# Patient Record
(demographics unavailable — no encounter records)

---

## 2024-10-31 NOTE — HISTORY OF PRESENT ILLNESS
[FreeTextEntry1] : 67-year-old male here for follow-up after recent hospital admission. Pt had difficulty emptying his bladder x 1 week. He was started on Levaquin, Pyridium and Flomax by his PCP.  Symptoms persisted so he went to the ED. He was found to be in retention and a kirkland catheter was placed. Drained 3L in 24 hours. Labs showed an elevated creatinine 16.6. He was admitted to [facility] x 4 days. Discharged with kirkland in place. Developed left flank pain a few hours after discharge and was seen at [facility]. He was diagnosed with pyelonephritis and was discharged with a 14-day course of Cefpodoxime. Today he denies any complaints. Kirkland is draining well. He is currently taking Tamsulosin 0.4mg. Last PSA 1.7 in May.  He was also found to be in A-fib while admitted. Now on Eliquis. Has a referral to Cardiology. Newly diagnosed with Gout. Completing a 5-day course of prednisone.    CT Stone freitas (10/28/24) IMPRESSION: 1.  Diffuse bilateral perinephric and periureteral fat stranding is suspicious for pyelonephritis and ureteritis, respectively. No hydronephrosis. Nonobstructive punctate right renal calculus. 2.  Circumferential bladder wall thickening with adjacent fat stranding is suspicious for cystitis. High-density intravesical material may represent blood products.  Renal US (10/26/24) IMPRESSION: 1. Mild bilateral hydronephrosis. 2. Poorly visualized but enlarged prostate. 3. Bladder with Kirkland catheter in situ.  Urine Culture 10/28/24-No growth 10/24/24-No growth  BMP 10/23/24-Creatinine:16.6 10/28/24-Creatinine:1.42  PMH-anxiety, A-fib, gout, sleep apnea uses CPAP PSH-left knee surgery  FamHx-Denies  malignancies SocHx-Denies smoking, drugs or alcohol use Meds-Eliquis, Coreg, bupropion, duloxetine, Flomax, zolpidem Allergies-seasonal

## 2024-10-31 NOTE — LETTER BODY
[Dear  ___] : Dear  [unfilled], [Consult Letter:] : I had the pleasure of evaluating your patient, [unfilled]. [Please see my note below.] : Please see my note below. [Consult Closing:] : Thank you very much for allowing me to participate in the care of this patient.  If you have any questions, please do not hesitate to contact me. [Sincerely,] : Sincerely, [FreeTextEntry3] : Cesar Olivo MD System Director Urogynecology/URPS Department of Urology [facility]   at [facility]  of Urology [facility] [employer] at [facility]

## 2024-10-31 NOTE — HISTORY OF PRESENT ILLNESS
[FreeTextEntry1] : 67-year-old male here for follow-up after recent hospital admission. Pt had difficulty emptying his bladder x 1 week. He was started on Levaquin, Pyridium and Flomax by his PCP.  Symptoms persisted so he went to the ED. He was found to be in retention and a kirkland catheter was placed. Drained 3L in 24 hours. Labs showed an elevated creatinine 16.6. He was admitted to [facility] x 4 days. Discharged with kirkland in place. Developed left flank pain a few hours after discharge and was seen at [facility]. He was diagnosed with pyelonephritis and was discharged with a 14-day course of Cefpodoxime. Today he denies any complaints. Kirkland is draining well. He is currently taking Tamsulosin 0.4mg. Last PSA 1.7 in May.  He was also found to be in A-fib while admitted. Now on Eliquis. Has a referral to Cardiology. Newly diagnosed with Gout. Completing a 5-day course of prednisone.    CT Stone freitas (10/28/24) IMPRESSION: 1.  Diffuse bilateral perinephric and periureteral fat stranding is suspicious for pyelonephritis and ureteritis, respectively. No hydronephrosis. Nonobstructive punctate right renal calculus. 2.  Circumferential bladder wall thickening with adjacent fat stranding is suspicious for cystitis. High-density intravesical material may represent blood products.  Renal US (10/26/24) IMPRESSION: 1. Mild bilateral hydronephrosis. 2. Poorly visualized but enlarged prostate. 3. Bladder with Kirkland catheter in situ.  Urine Culture 10/28/24-No growth 10/24/24-No growth  BMP 10/23/24-Creatinine:16.6 10/28/24-Creatinine:1.42  PMH-anxiety, A-fib, gout, sleep apnea uses CPAP PSH-left knee surgery  FamHx-Denies  malignancies SocHx-Denies smoking, drugs or alcohol use Meds-Eliquis, Coreg, bupropion, duloxetine, Flomax, zolpidem Allergies-seasonal

## 2024-10-31 NOTE — ASSESSMENT
[FreeTextEntry1] :   Impression/plan: 67-year-old male with urinary retention, kirkland catheter in place.   -Causes of urinary retention reviewed -Complete course of antibiotics -Increase Tamsulosin to 0.8mg daily -Kirkland to remain in place until Monday. F/u for TOV. If unable to void will discuss kirkland vs. CIC. May need UDS. Will discuss plan further at next appointment.   I, Dr. Cesar Olivo, personally performed the evaluation and management (E/M) services for this new patient.  That E/M includes conducting the clinically appropriate initial history &/or exam, assessing all conditions, and establishing the plan of care.  Today, my ALFREDO Sarahy, was here to observe &/or participate in the visit & follow plan of care established by me.

## 2024-11-06 NOTE — ASSESSMENT
[FreeTextEntry1] : EKG  AFib   echoCONCLUSIONS:   1. Normal left ventricular size.  2. Mildly reduced left ventricular systolic function, LVEF 45-50%.  3. Normal right ventricular size and systolic function.  4. Moderately dilated left atrium.  5. Aortic sclerosis without significant stenosis.  6. Mild tricuspid regurgitation.  7. Pulmonary artery systolic pressure is 33 mmHg.  8. No pericardial effusion.  9. Mildly dilated aortic root. 10. No prior echo is available for comparison  A/P  1. PAF Hx as above  Previously asymptomatic No palpitations  EKG today remains in AF HR 90 at rest, 110 after exercise Echo w LA dilatation (likely underlying NAKUL) predispose to AF during recent urinary retenstion CLARK Plan, - change coreg to toprol 50 (HR control) -continue eliquis - for now continue rate control, pending plan from U abou need for imminent procedures requiring interruption of AC Once plan resolved, and no further interruptions anticipated, if AF persists, plan DCCV, possible RFA   2. CHF, systolic  Echo as noted LVEF 45-50 suspect tachycardia induced Continue beta blocker  hold off ARNI for now (recent CLARK, creat up to 15, last week 1.4 at )  3. NAKUL on CPAP as per PMD   4. pre operative cardiac examination HX as above Recent echo findings ass noted Asymptomatic at workload >> 4 METS Accordingly there are no cardiac contraindications to planned urodynamics or other procedures  Pt may interrupt DOAC two days pre procedure for any interventional procedure  Continue toprol daily

## 2024-11-06 NOTE — HISTORY OF PRESENT ILLNESS
[FreeTextEntry1] : 65y/o M PAF  10/24 admit [facility] p/w c/o 1 week of urinary retention, treated outpatient for UTI with  Flomax, levaquin and pyridium, and told to come to ED after no improvement.  Noted to be in acute renal failure with highly elevated Cr, and potassium  requiring insulin. Barton placed, drained 3.5L in ED, and moved to telemetry  unit for close monitoring and fluid as needed. Also found on admission to be in  Afib in RVR likely iso urinary retention. Cr rapidly improved, patient feeling  well. Urology and nephrology consulted, neither with any acute intervention and  plans to follow up outpatient. Cardiology also consulted for newly diagnosed  Afib, as well as HFmrEF on TTE, recommended starting GDMT and Eliquis.        CARDIOLOGY  #Afib w/ RVR  EKG w/ RVR to 125, no history of prior. S/p Lopressor 5mg IVP x1, improved HR  to 100s. Asymptomatic. No known cardiac history. Noted on TTE to have  moderately dilated LA. Continue to monitor, continues to be in Afib, but not  RVR. Discharged on Eliquis 5mg BID and Coreg  6.25mg   #new HFmrEF  Noted on TTE to have reduced EF to 45-50%. History of heart disease in father.  Coreg started as above.  NAKUL on CPAP  Since discharge (and prior) denies CP, SOB, orthopnea, PND, palpitations or sarah. 90 pound weiught loss on ozempic, regained, then lost 45 pounds w diet. Active in gym, no limitatioins SInce d/c noted postural dizziness upon arising from cab, n has resolved  Now per op for  diagnostic procedures.

## 2024-11-11 NOTE — HISTORY OF PRESENT ILLNESS
[FreeTextEntry1] : 67-year-old male here for follow-up after recent hospital admission. Pt had difficulty emptying his bladder x 1 week. He was started on Levaquin, Pyridium and Flomax by his PCP. Symptoms persisted so he went to the ED. He was found to be in retention and a kirkland catheter was placed. Drained 3L in 24 hours. Labs showed an elevated creatinine 16.6. He was admitted to [facility] x 4 days. Discharged with kirkland in place. Developed left flank pain a few hours after discharge and was seen at [facility]. He was diagnosed with pyelonephritis and was discharged with a 14-day course of Cefpodoxime. Today he denies any complaints. Kirkland is draining well. He is currently taking Tamsulosin 0.4mg. Last PSA 1.7 in May. He was also found to be in A-fib while admitted. Now on Eliquis. Has a referral to Cardiology. Newly diagnosed with Gout. Completing a 5-day course of prednisone.  CT Stone freitas (10/28/24) IMPRESSION: 1. Diffuse bilateral perinephric and periureteral fat stranding is suspicious for pyelonephritis and ureteritis, respectively. No hydronephrosis. Nonobstructive punctate right renal calculus. 2. Circumferential bladder wall thickening with adjacent fat stranding is suspicious for cystitis. High-density intravesical material may represent blood products.  Renal US (10/26/24) IMPRESSION: 1. Mild bilateral hydronephrosis. 2. Poorly visualized but enlarged prostate. 3. Bladder with Kirkland catheter in situ.  Urine Culture 10/28/24-No growth 10/24/24-No growth  BMP 10/23/24-Creatinine:16.6 10/28/24-Creatinine:1.42  PMH-anxiety, A-fib, gout, sleep apnea uses CPAP PSH-left knee surgery FamHx-Denies  malignancies SocHx-Denies smoking, drugs or alcohol use Meds-Eliquis, Coreg, bupropion, duloxetine, Flomax, zolpidem Allergies-seasonal   11/4/2024  HPI: 67-year-old male with urinary retention. Kirkland placed in hospital. He is here today for TOV.    TOV: - 360cc sterile water instilled via existing catheter - Catheter removed in its entirety - Patient unable to void. Kirkland catheter replaced.   Plan:  Continue Tamsulosin 0.8mg F/u for UDS  I, Dr. Cesar Olivo, personally performed the evaluation and management (E/M) services for this established patient who presents today with (a) new problem(s)/exacerbation of (an) existing condition(s).  That E/M includes conducting the clinically appropriate interval history &/or exam, assessing all new/exacerbated conditions, and establishing a new plan of care.  Today, my ALFREDO Sarahy, was here to observe &/or participate in the visit & follow plan of care established by me.

## 2024-11-11 NOTE — HISTORY OF PRESENT ILLNESS
[FreeTextEntry1] : 67-year-old male here for follow-up after recent hospital admission. Pt had difficulty emptying his bladder x 1 week. He was started on Levaquin, Pyridium and Flomax by his PCP. Symptoms persisted so he went to the ED. He was found to be in retention and a kirkland catheter was placed. Drained 3L in 24 hours. Labs showed an elevated creatinine 16.6. He was admitted to [facility] x 4 days. Discharged with kirkland in place. Developed left flank pain a few hours after discharge and was seen at [facility]. He was diagnosed with pyelonephritis and was discharged with a 14-day course of Cefpodoxime. Today he denies any complaints. Kirkland is draining well. He is currently taking Tamsulosin 0.4mg. Last PSA 1.7 in May. He was also found to be in A-fib while admitted. Now on Eliquis. Has a referral to Cardiology. Newly diagnosed with Gout. Completing a 5-day course of prednisone.  CT Stone freitas (10/28/24) IMPRESSION: 1. Diffuse bilateral perinephric and periureteral fat stranding is suspicious for pyelonephritis and ureteritis, respectively. No hydronephrosis. Nonobstructive punctate right renal calculus. 2. Circumferential bladder wall thickening with adjacent fat stranding is suspicious for cystitis. High-density intravesical material may represent blood products.  Renal US (10/26/24) IMPRESSION: 1. Mild bilateral hydronephrosis. 2. Poorly visualized but enlarged prostate. 3. Bladder with Kirkland catheter in situ.  Urine Culture 10/28/24-No growth 10/24/24-No growth  BMP 10/23/24-Creatinine:16.6 10/28/24-Creatinine:1.42  PMH-anxiety, A-fib, gout, sleep apnea uses CPAP PSH-left knee surgery FamHx-Denies  malignancies SocHx-Denies smoking, drugs or alcohol use Meds-Eliquis, Coreg, bupropion, duloxetine, Flomax, zolpidem Allergies-seasonal   11/4/2024  HPI: 67-year-old male with urinary retention. Kirkland placed in hospital. He is here today for TOV.    TOV: - 360cc sterile water instilled via existing catheter - Catheter removed in its entirety - Patient unable to void. Kirkland catheter replaced.   Plan:  Continue Tamsulosin 0.8mg F/u for UDS  I, Dr. Cesar Olivo, personally performed the evaluation and management (E/M) services for this established patient who presents today with (a) new problem(s)/exacerbation of (an) existing condition(s).  That E/M includes conducting the clinically appropriate interval history &/or exam, assessing all new/exacerbated conditions, and establishing a new plan of care.  Today, my ALFREDO Sarahy, was here to observe &/or participate in the visit & follow plan of care established by me.

## 2024-11-11 NOTE — HISTORY OF PRESENT ILLNESS
[FreeTextEntry1] : 67-year-old male here for follow-up after recent hospital admission. Pt had difficulty emptying his bladder x 1 week. He was started on Levaquin, Pyridium and Flomax by his PCP. Symptoms persisted so he went to the ED. He was found to be in retention and a kikrland catheter was placed. Drained 3L in 24 hours. Labs showed an elevated creatinine 16.6. He was admitted to [facility] x 4 days. Discharged with kirkland in place. Developed left flank pain a few hours after discharge and was seen at [facility]. He was diagnosed with pyelonephritis and was discharged with a 14-day course of Cefpodoxime. Today he denies any complaints. Kirkland is draining well. He is currently taking Tamsulosin 0.4mg. Last PSA 1.7 in May. He was also found to be in A-fib while admitted. Now on Eliquis. Has a referral to Cardiology. Newly diagnosed with Gout. Completing a 5-day course of prednisone.  CT Stone freitas (10/28/24) IMPRESSION: 1. Diffuse bilateral perinephric and periureteral fat stranding is suspicious for pyelonephritis and ureteritis, respectively. No hydronephrosis. Nonobstructive punctate right renal calculus. 2. Circumferential bladder wall thickening with adjacent fat stranding is suspicious for cystitis. High-density intravesical material may represent blood products.  Renal US (10/26/24) IMPRESSION: 1. Mild bilateral hydronephrosis. 2. Poorly visualized but enlarged prostate. 3. Bladder with Kirkland catheter in situ.  Urine Culture 10/28/24-No growth 10/24/24-No growth  BMP 10/23/24-Creatinine:16.6 10/28/24-Creatinine:1.42  PMH-anxiety, A-fib, gout, sleep apnea uses CPAP PSH-left knee surgery FamHx-Denies  malignancies SocHx-Denies smoking, drugs or alcohol use Meds-Eliquis, Coreg, bupropion, duloxetine, Flomax, zolpidem Allergies-seasonal   11/4/2024  HPI: 67-year-old male with urinary retention. Kirkland placed in hospital. He is here today for TOV.    TOV: - 360cc sterile water instilled via existing catheter - Catheter removed in its entirety - Patient unable to void. Kirkland catheter replaced.   Plan:  Continue Tamsulosin 0.8mg F/u for UDS  I, Dr. Cesar Olivo, personally performed the evaluation and management (E/M) services for this established patient who presents today with (a) new problem(s)/exacerbation of (an) existing condition(s).  That E/M includes conducting the clinically appropriate interval history &/or exam, assessing all new/exacerbated conditions, and establishing a new plan of care.  Today, my ALFREDO Sarahy, was here to observe &/or participate in the visit & follow plan of care established by me.

## 2024-11-17 NOTE — HISTORY OF PRESENT ILLNESS
[FreeTextEntry1] : 67-year-old male here for follow-up after recent hospital admission. Pt had difficulty emptying his bladder x 1 week. He was started on Levaquin, Pyridium and Flomax by his PCP. Symptoms persisted so he went to the ED. He was found to be in retention and a kirkland catheter was placed. Drained 3L in 24 hours. Labs showed an elevated creatinine 16.6. He was admitted to [facility] x 4 days. Discharged with kirkland in place. Developed left flank pain a few hours after discharge and was seen at [facility]. He was diagnosed with pyelonephritis and was discharged with a 14-day course of Cefpodoxime. Today he denies any complaints. Kirkland is draining well. He is currently taking Tamsulosin 0.4mg. Last PSA 1.7 in May. He was also found to be in A-fib while admitted. Now on Eliquis. Has a referral to Cardiology. Newly diagnosed with Gout. Completing a 5-day course of prednisone.  CT Stone freitas (10/28/24) IMPRESSION: 1. Diffuse bilateral perinephric and periureteral fat stranding is suspicious for pyelonephritis and ureteritis, respectively. No hydronephrosis. Nonobstructive punctate right renal calculus. 2. Circumferential bladder wall thickening with adjacent fat stranding is suspicious for cystitis. High-density intravesical material may represent blood products.  Renal US (10/26/24) IMPRESSION: 1. Mild bilateral hydronephrosis. 2. Poorly visualized but enlarged prostate. 3. Bladder with Kirkland catheter in situ.  Urine Culture 10/28/24-No growth 10/24/24-No growth  BMP 10/23/24-Creatinine:16.6 10/28/24-Creatinine:1.42  PMH-anxiety, A-fib, gout, sleep apnea uses CPAP PSH-left knee surgery FamHx-Denies  malignancies SocHx-Denies smoking, drugs or alcohol use Meds-Eliquis, Coreg, bupropion, duloxetine, Flomax, zolpidem Allergies-seasonal  11/15/24  67-year-old male with urinary retention, kirkland catheter in place. Failed TOV, here today to have urodynamics performed, review results and discuss treatment options. Denies significant change in med/surg history since last office visit.   UDS -   Filling/Storage Phase: First sensation 217 mL, First desire 555 mL, Normal desire 636 mL and Cystometric capacity 658 mL. Involuntary contractions were present. Compliance: decreased. EMG Activity: normal.    Additional Comments: Patient unable to urinate, but does mount a bladder contraction. There is artifact with the flowmeter and well as fluctuant abdominal pressures.   Urodynamic Interpretation :   Normal bladder sensation. Increased bladder capacity. Patient experiencing detrusor instability. The patient has incomplete bladder emptying, a post void residual of 658 cc.   Additional Procedure Related Findings/Comments: Artifact during the study makes BOI calculation unreliable. He does mount a bladder contraction and he does not significant urge to void at capacity. Kirkland catheter replaced.    Plan: UDS - cordell bladder contraction, strong urge to void at capacity, DO present.  1.  UDS reviewed, reveals bladder function. We reviewed the UDS study, questions answered from patient and wide. We reviewed the surgical options at length, will be partially dependent on prostate volume. All questions answered, refer to Dr. Johnson for definitive treatment.

## 2024-11-17 NOTE — HISTORY OF PRESENT ILLNESS
[FreeTextEntry1] : 67-year-old male here for follow-up after recent hospital admission. Pt had difficulty emptying his bladder x 1 week. He was started on Levaquin, Pyridium and Flomax by his PCP. Symptoms persisted so he went to the ED. He was found to be in retention and a kirkland catheter was placed. Drained 3L in 24 hours. Labs showed an elevated creatinine 16.6. He was admitted to [facility] x 4 days. Discharged with kirkland in place. Developed left flank pain a few hours after discharge and was seen at [facility]. He was diagnosed with pyelonephritis and was discharged with a 14-day course of Cefpodoxime. Today he denies any complaints. Kirkland is draining well. He is currently taking Tamsulosin 0.4mg. Last PSA 1.7 in May. He was also found to be in A-fib while admitted. Now on Eliquis. Has a referral to Cardiology. Newly diagnosed with Gout. Completing a 5-day course of prednisone.  CT Stone freitas (10/28/24) IMPRESSION: 1. Diffuse bilateral perinephric and periureteral fat stranding is suspicious for pyelonephritis and ureteritis, respectively. No hydronephrosis. Nonobstructive punctate right renal calculus. 2. Circumferential bladder wall thickening with adjacent fat stranding is suspicious for cystitis. High-density intravesical material may represent blood products.  Renal US (10/26/24) IMPRESSION: 1. Mild bilateral hydronephrosis. 2. Poorly visualized but enlarged prostate. 3. Bladder with Kirkland catheter in situ.  Urine Culture 10/28/24-No growth 10/24/24-No growth  BMP 10/23/24-Creatinine:16.6 10/28/24-Creatinine:1.42  PMH-anxiety, A-fib, gout, sleep apnea uses CPAP PSH-left knee surgery FamHx-Denies  malignancies SocHx-Denies smoking, drugs or alcohol use Meds-Eliquis, Coreg, bupropion, duloxetine, Flomax, zolpidem Allergies-seasonal  11/15/24  67-year-old male with urinary retention, kirkland catheter in place. Failed TOV, here today to have urodynamics performed, review results and discuss treatment options. Denies significant change in med/surg history since last office visit.   UDS -   Filling/Storage Phase: First sensation 217 mL, First desire 555 mL, Normal desire 636 mL and Cystometric capacity 658 mL. Involuntary contractions were present. Compliance: decreased. EMG Activity: normal.    Additional Comments: Patient unable to urinate, but does mount a bladder contraction. There is artifact with the flowmeter and well as fluctuant abdominal pressures.   Urodynamic Interpretation :   Normal bladder sensation. Increased bladder capacity. Patient experiencing detrusor instability. The patient has incomplete bladder emptying, a post void residual of 658 cc.   Additional Procedure Related Findings/Comments: Artifact during the study makes BOI calculation unreliable. He does mount a bladder contraction and he does not significant urge to void at capacity. Kirkland catheter replaced.    Plan: UDS - cordell bladder contraction, strong urge to void at capacity, DO present.  1.  UDS reviewed, reveals bladder function. We reviewed the UDS study, questions answered from patient and wide. We reviewed the surgical options at length, will be partially dependent on prostate volume. All questions answered, refer to Dr. Johnson for definitive treatment.

## 2024-11-17 NOTE — LETTER BODY
[Dear  ___] : Dear  [unfilled], [Courtesy Letter:] : I had the pleasure of seeing your patient, [unfilled], in my office today. [Please see my note below.] : Please see my note below. [Consult Closing:] : Thank you very much for allowing me to participate in the care of this patient.  If you have any questions, please do not hesitate to contact me. [Sincerely,] : Sincerely, [FreeTextEntry3] : Cesar Olivo MD System Director Urogynecology/URPS Department of Urology [facility]   at [facility]  of Urology [employer] at [facility]

## 2024-11-17 NOTE — LETTER BODY
[Dear  ___] : Dear  [unfilled], [Courtesy Letter:] : I had the pleasure of seeing your patient, [unfilled], in my office today. [Please see my note below.] : Please see my note below. [Consult Closing:] : Thank you very much for allowing me to participate in the care of this patient.  If you have any questions, please do not hesitate to contact me. [Sincerely,] : Sincerely, [FreeTextEntry3] : Cesar Olivo MD System Director Urogynecology/URPS Department of Urology [facility]   at [facility]  of Urology [facility] [employer] at [facility]

## 2024-11-25 NOTE — HISTORY OF PRESENT ILLNESS
[FreeTextEntry1] : PCP: Dr. Cristian Brock. Cardiologist: Sandro Calhoun.   Admitted 1 month ago to [facility] with obstructive uropathy, creatinine 15 with hyperkalemia. Did not require dialysis. Following up closely with urology. Creatinine now down to 1.42 on 28Oct24, eGFR 54 by CKD-EPIcr. Per fabientent, creatinine 1.13 was baseline. 01Nov: Cr 1.48, eGFR 52 by CKD-EPIcr.   Presented to ED on 28Oct with flank pain. Treated for pyelo based on CT results. No fevers or flank pain currently.   Catheter remains in place.   FH: SH:

## 2024-11-25 NOTE — PHYSICAL EXAM
[General Appearance - Alert] : alert [General Appearance - In No Acute Distress] : in no acute distress [Auscultation Breath Sounds / Voice Sounds] : lungs were clear to auscultation bilaterally [Heart Rate And Rhythm] : heart rate was normal and rhythm regular [Heart Sounds] : normal S1 and S2 [Heart Sounds Gallop] : no gallops [Murmurs] : no murmurs [Heart Sounds Pericardial Friction Rub] : no pericardial rub [Abdomen Soft] : soft [Abdomen Tenderness] : non-tender [] : no hepato-splenomegaly [Abdomen Mass (___ Cm)] : no abdominal mass palpated [Cervical Lymph Nodes Enlarged Posterior Bilaterally] : posterior cervical [Cervical Lymph Nodes Enlarged Anterior Bilaterally] : anterior cervical [Supraclavicular Lymph Nodes Enlarged Bilaterally] : supraclavicular [Neck Appearance] : the appearance of the neck was normal [Neck Cervical Mass (___cm)] : no neck mass was observed [Jugular Venous Distention Increased] : there was no jugular-venous distention [Thyroid Diffuse Enlargement] : the thyroid was not enlarged [Thyroid Nodule] : there were no palpable thyroid nodules

## 2024-11-25 NOTE — ASSESSMENT
[FreeTextEntry1] : # CLARK due to obstruction now improved, with stage 3 CKD. * Recheck labs, including cystatin C, monoclonal protein evaluation, urinalysis, and urine albumin quantification.  * No containdications to ANRI or ARB.  * Will consider SGLT2i. Will hold on this therapy for now given catheter in place.  * Therapies for kidney disease: blood pressure control; other evidence-based therapies recommended including exercise, a plant-based lower oxalate diet, and 400 mcg folic acid daily * Cardiovascular disease prevention: counseling on healthy diet, physical activity, weight loss, alcohol limitation, blood pressure control; cholesterol therapy; cardiology evaluation/followup advised * A counseling information sheet on CKD has been given (which they have been instructed to read). * The patient has been counseled that chronic kidney disease is a significant condition and regular office follow-up with me (at least every 2-3 months for now) is important for monitoring and their health, and that it is their responsibility to make a follow-up appointment. * The patient has been counseled never to stop taking their medications without discussing it with me or another doctor. * The patient has been counseled on avoiding NSAIDs. * The patient has been counseled on risk of worsening kidney function and instructed to immediately call and speak with me and go immediately to ER with any severe symptoms, nausea, vomiting, diarrhea, chest pain, or shortness of breath.  # Urinary tract obstruction. * follow up with urology.

## 2024-11-26 NOTE — ASSESSMENT
[FreeTextEntry1] : 67-year-old male with BPH, 160 cc prostate, urinary c/b CLARK with creatinine 16.6 with hydronephrosis. Creatinine and hydronephrosis improved. Discussed that renal injury related to retention is absolute indication for prostate debulking procedure.  I made it clear that because of his large prostate size > 80 grams, AUA recommendation is to perform a simple prostatectomy due to the ability to obtain effective and durable improvement in voiding symptoms. A simple prostatectomy can be performed via an open approach, laparoscopic approach or transurethral laser enucleation approach. We discussed the risks and benefits of each approach. Overall, the long term outcomes are similar. However, the laser enucleation procedure has the least morbidity with quickest recovery of the three options. Therefore, I have recommended laser enucleation of the prostate performed via a transurethral approach.  Mr. Wiley decided to proceed with laser enucleation of prostate followed by prostate morcellation. Therefore, I spent a good amount of time discussing the risks and benefits of this procedure. We discussed potential risks of incontinence, retrograde ejaculation and infertility, erectile dysfunction, irritative voiding symptoms, injury to the urethra and bladder, rare need to convert to an open surgery.   - OR for HoLEP at [facility] 12/17/24  - Pre-op medical and cardiac clearance  - Hold eliquis for procedure

## 2024-11-26 NOTE — LETTER BODY
[Dear  ___] : Dear  [unfilled], [Courtesy Letter:] : I had the pleasure of seeing your patient, [unfilled], in my office today. [Please see my note below.] : Please see my note below. [Consult Closing:] : Thank you very much for allowing me to participate in the care of this patient.  If you have any questions, please do not hesitate to contact me. [Sincerely,] : Sincerely, [FreeTextEntry3] : Karen Johnson MD

## 2024-11-26 NOTE — HISTORY OF PRESENT ILLNESS
[FreeTextEntry1] : 67-year-old male with BPH, 160 cc prostate, urinary c/b CLARK with creatinine 16.6 with hydronephrosis. Creatinine and hydronephrosis improved.   Last PSA 1.7 in May.  He was also found to be in A-fib while admitted. Now on Eliquis.   CT [place] (10/28/24) IMPRESSION: 1. Diffuse bilateral perinephric and periureteral fat stranding is suspicious for pyelonephritis and ureteritis, respectively. No hydronephrosis. Nonobstructive punctate right renal calculus. 2. Circumferential bladder wall thickening with adjacent fat stranding is suspicious for cystitis. High-density intravesical material may represent blood products.  Renal US (10/26/24) IMPRESSION: 1. Mild bilateral hydronephrosis. 2. Poorly visualized but enlarged prostate. 3. Bladder with Barton catheter in situ.  Urine Culture 10/28/24-No growth 10/24/24-No growth  BMP 10/23/24-Creatinine:16.6 10/28/24-Creatinine:1.42  UDS 11/15/24 - Normal bladder sensation. Increased bladder capacity. Patient experiencing detrusor instability. The patient has incomplete bladder emptying, a post void residual of 658 cc.  PMH-anxiety, A-fib, gout, sleep apnea uses CPAP PSH-left knee surgery FamHx-Denies  malignancies SocHx-Denies smoking, drugs or alcohol use Meds-Eliquis, Coreg, bupropion, duloxetine, Flomax, zolpidem Allergies-seasonal

## 2024-12-06 NOTE — HISTORY OF PRESENT ILLNESS
[FreeTextEntry1] : 65y/o M PAF, pre op    10/24 admit [facility] p/w c/o 1 week of urinary retention, treated outpatient for UTI with  Flomax, levaquin and pyridium, and told to come to ED after no improvement.  Noted to be in acute renal failure with highly elevated Cr, and potassium  requiring insulin. Barton placed, drained 3.5L in ED, and moved to telemetry  unit for close monitoring and fluid as needed. Also found on admission to be in  Afib in RVR likely iso urinary retention. Cr rapidly improved, patient feeling  well. Urology and nephrology consulted, neither with any acute intervention and  plans to follow up outpatient. Cardiology also consulted for newly diagnosed  Afib, as well as HFmrEF on TTE, recommended starting GDMT and Eliquis.        CARDIOLOGY  #Afib w/ RVR  EKG w/ RVR to 125, no history of prior. S/p Lopressor 5mg IVP x1, improved HR  to 100s. Asymptomatic. No known cardiac history. Noted on TTE to have  moderately dilated LA. Continue to monitor, continues to be in Afib, but not  RVR. Discharged on Eliquis 5mg BID and Coreg  6.25mg   #new HFmrEF  Noted on TTE to have reduced EF to 45-50%. History of heart disease in father.  Coreg started as above.  NAKUL on CPAP  Since discharge (and prior) denies CP, SOB, orthopnea, PND, palpitations or sarah. 90 pound weiught loss on ozempic, regained, then lost 45 pounds w diet. Active in gym, no limitatioins SInce d/c noted postural dizziness upon arising from cab, n has resolved  Now per op for  diagnostic procedures.

## 2024-12-06 NOTE — ASSESSMENT
[FreeTextEntry1] : EKG AF  A/P 1. PAF Hx as above  Previously asymptomatic No palpitations  EKG today remains in AF HR 50 at rest Echo w LA dilatation (likely underlying NAKUL) predispose to AF during recent urinary retenstion CLARK Plan, - continue  toprol 50 (HR control) - continue eliquis - for now continue rate control, Once  plan resolved, and no further interruptions anticipated, will repeat echo   if AF persists, plan DCCV, possible RFA Consider PYP scan first   2. CHF, systolic  Echo as noted LVEF 45-50 suspect tachycardia induced Continue beta blocker  hold off ARNI for now (recent CLARK, creat up to 15, last week 1.4 at ) repeat echo post op   3. NAKUL on CPAP as per PMD  4. pre operative cardiac examination  laser enucleation of prostate followed by prostate morcellation HX as above Recent echo findings as noted Asymptomatic at workload >> 4 METS Accordingly there are no cardiac contraindications to planned urodynamics or other procedures  Pt may interrupt DOAC two days pre procedure for any interventional procedure  Continue toprol daily.

## 2024-12-18 NOTE — HISTORY OF PRESENT ILLNESS
[FreeTextEntry1] : 67-year-old male with BPH, 160 cc prostate, urinary c/b CLARK with creatinine 16.6 with hydronephrosis. Creatinine and hydronephrosis improved.   Last PSA 1.7 in May.  He was also found to be in A-fib while admitted. Now on Eliquis.   CT [place] (10/28/24) IMPRESSION: 1. Diffuse bilateral perinephric and periureteral fat stranding is suspicious for pyelonephritis and ureteritis, respectively. No hydronephrosis. Nonobstructive punctate right renal calculus. 2. Circumferential bladder wall thickening with adjacent fat stranding is suspicious for cystitis. High-density intravesical material may represent blood products.  Renal US (10/26/24) IMPRESSION: 1. Mild bilateral hydronephrosis. 2. Poorly visualized but enlarged prostate. 3. Bladder with Barton catheter in situ.  Urine Culture 10/28/24-No growth 10/24/24-No growth  BMP 10/23/24-Creatinine:16.6 10/28/24-Creatinine:1.42  UDS 11/15/24 - Normal bladder sensation. Increased bladder capacity. Patient experiencing detrusor instability. The patient has incomplete bladder emptying, a post void residual of 658 cc.  PMH-anxiety, A-fib, gout, sleep apnea uses CPAP PSH-left knee surgery FamHx-Denies  malignancies SocHx-Denies smoking, drugs or alcohol use Meds-Eliquis, Coreg, bupropion, duloxetine, Flomax, zolpidem Allergies-seasonal   12/17/24: S/P HoLEP, procedure uncomplicated.  12/18/24: Presents for TOV. Voided less than 100 cc with  cc. Not uncomfortable, indicates he has difficulty urinating in Dr office.

## 2024-12-23 NOTE — ASSESSMENT
[FreeTextEntry1] : EKG sinus segundo  A/P 1. PAF Hx as above  Previously asymptomatic No palpitations  EKG today  sinus segundo 45 at rest Echo w LA dilatation (likely underlying NAKUL) predispose to AF during recent urinary retenstion CLARK  repeat echo - nl LVEF  repeat zio   if AF , possible RFA, may need PPM if signif bradycardia  Consider PYP scan first  2. CHF, systolic  Echo as noted LVEF 45-50, nl today  suspect tachycardia induced Continue beta blocker, dose as above   hold off ARNI for now (recent CLARK, creat up to 15, last week 1.4 at )   3. NAKUL on CPAP as per PMD

## 2024-12-23 NOTE — HISTORY OF PRESENT ILLNESS
[FreeTextEntry1] : 65y/o M f/u PAF,   10/24 admit [facility] p/w c/o 1 week of urinary retention, treated outpatient for UTI with  Flomax, levaquin and pyridium, and told to come to ED after no improvement.  Noted to be in acute renal failure with highly elevated Cr, and potassium  requiring insulin. Barton placed, drained 3.5L in ED, and moved to telemetry  unit for close monitoring and fluid as needed. Also found on admission to be in  Afib in RVR likely iso urinary retention. Cr rapidly improved, patient feeling  well. Urology and nephrology consulted, neither with any acute intervention and  plans to follow up outpatient. Cardiology also consulted for newly diagnosed  Afib, as well as HFmrEF on TTE, recommended starting GDMT and Eliquis.        CARDIOLOGY  #Afib w/ RVR  EKG w/ RVR to 125, no history of prior. S/p Lopressor 5mg IVP x1, improved HR  to 100s. Asymptomatic. No known cardiac history. Noted on TTE to have  moderately dilated LA. Continue to monitor, continues to be in Afib, but not  RVR. Discharged on Eliquis 5mg BID and Coreg  6.25mg   #new HFmrEF  Noted on TTE to have reduced EF to 45-50%. History of heart disease in father.  Coreg started as above.  NAKUL on CPAP  Since discharge (and prior) denies CP, SOB, orthopnea, PND, palpitations or sarah. 90 pound weiught loss on ozempic, regained, then lost 45 pounds w diet. Active in gym, no limitatioins SInce d/c noted postural dizziness upon arising from cab, n has resolved  Seen last for pre op for  diagnostic procedures.

## 2024-12-30 NOTE — ASSESSMENT
[FreeTextEntry1] : 67-year-old male with BPH, 160 cc prostate, urinary c/b CLARK with creatinine 16.6 with hydronephrosis. Creatinine and hydronephrosis improved. S/P HoLEP 12/17/24. Doing well with strong stream, improved emptying, resolving hematuria and no leakage. Pathology with 83 grams BPH.     - F/U in 3 months (IPSS, UA, PVR, PSA)

## 2024-12-30 NOTE — HISTORY OF PRESENT ILLNESS
[FreeTextEntry1] : 67-year-old male with BPH, 160 cc prostate, urinary c/b CLARK with creatinine 16.6 with hydronephrosis. Creatinine and hydronephrosis improved.   Last PSA 1.7 in May.  He was also found to be in A-fib while admitted. Now on Eliquis.   CT [place] (10/28/24) IMPRESSION: 1. Diffuse bilateral perinephric and periureteral fat stranding is suspicious for pyelonephritis and ureteritis, respectively. No hydronephrosis. Nonobstructive punctate right renal calculus. 2. Circumferential bladder wall thickening with adjacent fat stranding is suspicious for cystitis. High-density intravesical material may represent blood products.  Renal US (10/26/24) IMPRESSION: 1. Mild bilateral hydronephrosis. 2. Poorly visualized but enlarged prostate. 3. Bladder with Barton catheter in situ.  Urine Culture 10/28/24-No growth 10/24/24-No growth  BMP 10/23/24-Creatinine:16.6 10/28/24-Creatinine:1.42  UDS 11/15/24 - Normal bladder sensation. Increased bladder capacity. Patient experiencing detrusor instability. The patient has incomplete bladder emptying, a post void residual of 658 cc.  PMH-anxiety, A-fib, gout, sleep apnea uses CPAP PSH-left knee surgery FamHx-Denies  malignancies SocHx-Denies smoking, drugs or alcohol use Meds-Eliquis, Coreg, bupropion, duloxetine, Flomax, zolpidem Allergies-seasonal   12/17/24: S/P HoLEP, procedure uncomplicated.  12/18/24: Presents for TOV. Voided less than 100 cc with  cc. Not uncomfortable, indicates he has difficulty urinating in Dr office.  12/20/24: Here for f/u. Doing well with strong stream, improved emptying, resolving hematuria and no leakage. Pathology with 83 grams BPH.   PVR: 155 ml  IPSS: 2/0